# Patient Record
Sex: FEMALE | Race: OTHER | Employment: UNEMPLOYED | URBAN - METROPOLITAN AREA
[De-identification: names, ages, dates, MRNs, and addresses within clinical notes are randomized per-mention and may not be internally consistent; named-entity substitution may affect disease eponyms.]

---

## 2023-12-14 ENCOUNTER — TELEPHONE (OUTPATIENT)
Dept: PERINATAL CARE | Age: 23
End: 2023-12-14

## 2024-01-05 RX ORDER — ASPIRIN 81 MG/81MG
2 CAPSULE ORAL DAILY
COMMUNITY

## 2024-01-05 RX ORDER — .BETA.-CAROTENE, ASCORBIC ACID, CHOLECALCIFEROL, .ALPHA.-TOCOPHEROL ACETATE, DL-, THIAMINE MONONITRATE, RIBOFLAVIN, NIACINAMIDE, PYRIDOXINE HYDROCHLORIDE, FOLIC ACID, CYANOCOBALAMIN, CALCIUM PANTOTHENATE, CALCIUM CARBONATE, FERROUS FUMARATE, ZINC OXIDE, AND DOCUSATE SODIUM 1000; 100; 400; 30; 3; 3; 15; 20; 1; 12; 7; 200; 29; 20; 25 [IU]/1; MG/1; [IU]/1; [IU]/1; MG/1; MG/1; MG/1; MG/1; MG/1; UG/1; MG/1; MG/1; MG/1; MG/1; MG/1
1 TABLET, COATED ORAL DAILY
COMMUNITY

## 2024-01-08 ENCOUNTER — TELEPHONE (OUTPATIENT)
Dept: PERINATAL CARE | Age: 24
End: 2024-01-08

## 2024-01-10 ENCOUNTER — ROUTINE PRENATAL (OUTPATIENT)
Dept: PERINATAL CARE | Age: 24
End: 2024-01-10

## 2024-01-10 VITALS
WEIGHT: 107 LBS | HEART RATE: 102 BPM | DIASTOLIC BLOOD PRESSURE: 77 MMHG | SYSTOLIC BLOOD PRESSURE: 113 MMHG | BODY MASS INDEX: 18.96 KG/M2 | HEIGHT: 63 IN

## 2024-01-10 DIAGNOSIS — O30.032 MONOCHORIONIC DIAMNIOTIC TWIN GESTATION IN SECOND TRIMESTER: Primary | ICD-10-CM

## 2024-01-10 PROCEDURE — 76805 OB US >/= 14 WKS SNGL FETUS: CPT

## 2024-01-10 RX ORDER — LANOLIN ALCOHOL/MO/W.PET/CERES
50 CREAM (GRAM) TOPICAL DAILY
COMMUNITY

## 2024-01-10 NOTE — PROGRESS NOTES
Patient seen today for consult and level II ultrasound for mono/di twins. Denies any recent vaginal bleeding. Denies leaking of fluid or pain and has not felt fetal movement yet.

## 2024-01-10 NOTE — PROGRESS NOTES
[facility]    The patient was seen for a sonogram and consultation regarding mono/di twins.    Please see the full ultrasound report under the Media tab.    Maria Alejandra Johnson MD  01/10/24

## 2024-01-24 ENCOUNTER — ROUTINE PRENATAL (OUTPATIENT)
Dept: PERINATAL CARE | Age: 24
End: 2024-01-24

## 2024-01-24 VITALS
SYSTOLIC BLOOD PRESSURE: 99 MMHG | DIASTOLIC BLOOD PRESSURE: 66 MMHG | HEART RATE: 100 BPM | BODY MASS INDEX: 19.31 KG/M2 | WEIGHT: 109 LBS

## 2024-01-24 DIAGNOSIS — O30.032 MONOCHORIONIC DIAMNIOTIC TWIN PREGNANCY IN SECOND TRIMESTER: Primary | ICD-10-CM

## 2024-01-24 PROCEDURE — 76816 OB US FOLLOW-UP PER FETUS: CPT

## 2024-01-24 PROCEDURE — 76817 TRANSVAGINAL US OBSTETRIC: CPT

## 2024-01-24 PROCEDURE — 76821 MIDDLE CEREBRAL ARTERY ECHO: CPT

## 2024-01-24 PROCEDURE — 76820 UMBILICAL ARTERY ECHO: CPT

## 2024-01-24 NOTE — PROGRESS NOTES
Pt seen today for cervical length MCA and UA dopplers,assess fetal bladders and amniotic fluid levels-mono/di twins and growth. Pt states regarding movement, she \"feels like little pinching in her belly.\" Pt denies leaking of fluid or vaginal bleeding.

## 2024-01-24 NOTE — PROGRESS NOTES
Maternal-Fetal Medicine    Please see the full documentation embedded in the ultrasound report (in Media Tab)  for complete detail.     Short synopsis of the findings & recommendations:     Both twins with appropriate EFWs and Doppler findings. They are concordant with appropriate amniotic fluid volumes each. Bladders are visible for both.   - Cervical length is reassuring.   - No previa.     Electronically signed by Edgar Reese MD on 1/24/2024 at 3:54 PM

## 2024-02-07 ENCOUNTER — ROUTINE PRENATAL (OUTPATIENT)
Dept: PERINATAL CARE | Age: 24
End: 2024-02-07

## 2024-02-07 VITALS
WEIGHT: 111 LBS | SYSTOLIC BLOOD PRESSURE: 100 MMHG | HEART RATE: 109 BPM | BODY MASS INDEX: 19.66 KG/M2 | DIASTOLIC BLOOD PRESSURE: 70 MMHG

## 2024-02-07 DIAGNOSIS — Z3A.19 19 WEEKS GESTATION OF PREGNANCY: ICD-10-CM

## 2024-02-07 DIAGNOSIS — O30.032 MONOCHORIONIC DIAMNIOTIC TWIN GESTATION IN SECOND TRIMESTER: Primary | ICD-10-CM

## 2024-02-07 PROCEDURE — 76820 UMBILICAL ARTERY ECHO: CPT

## 2024-02-07 PROCEDURE — 76811 OB US DETAILED SNGL FETUS: CPT

## 2024-02-07 PROCEDURE — 76821 MIDDLE CEREBRAL ARTERY ECHO: CPT

## 2024-02-07 PROCEDURE — 76812 OB US DETAILED ADDL FETUS: CPT

## 2024-02-07 PROCEDURE — 76817 TRANSVAGINAL US OBSTETRIC: CPT

## 2024-02-07 NOTE — PROGRESS NOTES
Pt seen today for Level II ultrasound for growth, cervical length, MCA and UA dopplers, assess fetal bladders, and amniotic fluid levels-mono/di twins. Pt reports positive fetal movement with both babies. Denies leaking of fluid or vaginal bleeding. Pt reports lot of discharge. Pt also reports bowel movement X 2 this morning with medium amount of blood. This has been only occurrence this far. Pt reports stool is hard. Instructed pt to call OB provider  to let them know having hard stool with blood and to ask for stool softner. Pt verbalized understanding. Used [employer] language services- to communicate with pt.

## 2024-02-21 ENCOUNTER — ROUTINE PRENATAL (OUTPATIENT)
Dept: PERINATAL CARE | Age: 24
End: 2024-02-21

## 2024-02-21 VITALS
BODY MASS INDEX: 20.37 KG/M2 | WEIGHT: 115 LBS | HEART RATE: 94 BPM | SYSTOLIC BLOOD PRESSURE: 92 MMHG | DIASTOLIC BLOOD PRESSURE: 61 MMHG

## 2024-02-21 DIAGNOSIS — O09.92 SUPERVISION OF HIGH RISK PREGNANCY IN SECOND TRIMESTER: ICD-10-CM

## 2024-02-21 DIAGNOSIS — Z3A.21 21 WEEKS GESTATION OF PREGNANCY: ICD-10-CM

## 2024-02-21 DIAGNOSIS — O30.032 MONOCHORIONIC DIAMNIOTIC TWIN GESTATION IN SECOND TRIMESTER: Primary | ICD-10-CM

## 2024-02-21 PROCEDURE — 76816 OB US FOLLOW-UP PER FETUS: CPT

## 2024-02-21 PROCEDURE — 76820 UMBILICAL ARTERY ECHO: CPT

## 2024-02-21 PROCEDURE — 76821 MIDDLE CEREBRAL ARTERY ECHO: CPT

## 2024-02-21 PROCEDURE — 76817 TRANSVAGINAL US OBSTETRIC: CPT

## 2024-02-21 NOTE — PROGRESS NOTES
[facility] at [facility]   The patient was seen for a detailed sonogram and consultation regarding mono/di twin gestation   Please see the full ultrasound report under the Media tab.   Maria Alejandra Whitfield DO   2/21/24

## 2024-02-21 NOTE — PROGRESS NOTES
Patient seen today for Level II ultrasound for growth, cervical length, MCA and UA dopplers, and to assess fetal bladders and amniotic fluid levels. Reports + fetal movement of both babies. Denies vaginal bleeding, leaking of fluid, or pain. Patient states she has an appointment at [facility] on 2/26/24.

## 2024-03-05 ENCOUNTER — TELEPHONE (OUTPATIENT)
Dept: PERINATAL CARE | Age: 24
End: 2024-03-05

## 2024-03-06 ENCOUNTER — TELEPHONE (OUTPATIENT)
Dept: PERINATAL CARE | Age: 24
End: 2024-03-06

## 2024-03-06 ENCOUNTER — ROUTINE PRENATAL (OUTPATIENT)
Dept: PERINATAL CARE | Age: 24
End: 2024-03-06

## 2024-03-06 VITALS
HEART RATE: 101 BPM | SYSTOLIC BLOOD PRESSURE: 99 MMHG | DIASTOLIC BLOOD PRESSURE: 68 MMHG | BODY MASS INDEX: 20.9 KG/M2 | WEIGHT: 118 LBS

## 2024-03-06 DIAGNOSIS — O30.032 MONOCHORIONIC DIAMNIOTIC TWIN GESTATION IN SECOND TRIMESTER: Primary | ICD-10-CM

## 2024-03-06 PROCEDURE — 76820 UMBILICAL ARTERY ECHO: CPT

## 2024-03-06 PROCEDURE — 76817 TRANSVAGINAL US OBSTETRIC: CPT

## 2024-03-06 PROCEDURE — 76816 OB US FOLLOW-UP PER FETUS: CPT

## 2024-03-06 PROCEDURE — 76821 MIDDLE CEREBRAL ARTERY ECHO: CPT

## 2024-03-06 NOTE — PROGRESS NOTES
Patient seen today for growth, UA and MCA dopplers, cervical length, DVP, and to assess fetal bladders of mono/di twins. Reports +fetal movement of both babies. Denies vaginal bleeding, leaking of fluid, or pain.

## 2024-03-20 ENCOUNTER — ROUTINE PRENATAL (OUTPATIENT)
Dept: PERINATAL CARE | Age: 24
End: 2024-03-20

## 2024-03-20 VITALS
WEIGHT: 123.4 LBS | SYSTOLIC BLOOD PRESSURE: 100 MMHG | DIASTOLIC BLOOD PRESSURE: 68 MMHG | BODY MASS INDEX: 21.86 KG/M2 | HEART RATE: 99 BPM

## 2024-03-20 DIAGNOSIS — O30.032 MONOCHORIONIC DIAMNIOTIC TWIN GESTATION IN SECOND TRIMESTER: Primary | ICD-10-CM

## 2024-03-20 PROCEDURE — 76820 UMBILICAL ARTERY ECHO: CPT

## 2024-03-20 PROCEDURE — 76816 OB US FOLLOW-UP PER FETUS: CPT

## 2024-03-20 PROCEDURE — 76821 MIDDLE CEREBRAL ARTERY ECHO: CPT

## 2024-03-20 NOTE — PROGRESS NOTES
Presents to [facility] for Level II UDS for growth, MCA and UA dopplers, assess fetal bladders, and JUSTEN for mono-di twins. Reports feeling good fetal movement, denies vaginal bleeding, leakage or pain.  available in person during visit.    [facility]   The patient was seen for a detailed sonogram and consultation regarding twin.   Please see the full ultrasound report under the Media tab. There is an issue in network reports not signed yet but both twins had normal fluid, concordant growth. Recommend to continue growth scan and  testing as scheduled.     Jluis Torres MD   3/20/24

## 2024-04-03 ENCOUNTER — ROUTINE PRENATAL (OUTPATIENT)
Dept: PERINATAL CARE | Age: 24
End: 2024-04-03

## 2024-04-03 VITALS
WEIGHT: 126 LBS | SYSTOLIC BLOOD PRESSURE: 93 MMHG | DIASTOLIC BLOOD PRESSURE: 62 MMHG | HEART RATE: 95 BPM | BODY MASS INDEX: 22.32 KG/M2

## 2024-04-03 DIAGNOSIS — O30.032 MONOCHORIONIC DIAMNIOTIC TWIN GESTATION IN SECOND TRIMESTER: Primary | ICD-10-CM

## 2024-04-03 PROCEDURE — 76816 OB US FOLLOW-UP PER FETUS: CPT

## 2024-04-03 PROCEDURE — 76820 UMBILICAL ARTERY ECHO: CPT

## 2024-04-03 PROCEDURE — 76821 MIDDLE CEREBRAL ARTERY ECHO: CPT

## 2024-04-03 NOTE — PROGRESS NOTES
Patient seen today for level II ultrasound for growth, MCA and UA dopplers, JUSTEN, and to assess fetal bladders for  mono/di twins. Reports good movement of both babies. Denies vaginal bleeding, leaking of fluid, or pain.

## 2024-04-03 NOTE — PROGRESS NOTES
[facility] at [facility]   The patient was seen for a follow up sonogram regarding mono/di twin gestation.   Please see the full ultrasound report under the Media tab.   Maria Alejandra Johnson MD   4/3/24

## 2024-04-17 ENCOUNTER — ROUTINE PRENATAL (OUTPATIENT)
Dept: PERINATAL CARE | Age: 24
End: 2024-04-17

## 2024-04-17 VITALS
SYSTOLIC BLOOD PRESSURE: 97 MMHG | WEIGHT: 127.8 LBS | DIASTOLIC BLOOD PRESSURE: 66 MMHG | HEART RATE: 90 BPM | BODY MASS INDEX: 22.64 KG/M2

## 2024-04-17 DIAGNOSIS — Z3A.29 29 WEEKS GESTATION OF PREGNANCY: ICD-10-CM

## 2024-04-17 DIAGNOSIS — O30.032 MONOCHORIONIC DIAMNIOTIC TWIN GESTATION IN SECOND TRIMESTER: Primary | ICD-10-CM

## 2024-04-17 PROCEDURE — 76820 UMBILICAL ARTERY ECHO: CPT

## 2024-04-17 PROCEDURE — 76816 OB US FOLLOW-UP PER FETUS: CPT

## 2024-04-17 PROCEDURE — 76821 MIDDLE CEREBRAL ARTERY ECHO: CPT

## 2024-04-17 NOTE — PROGRESS NOTES
Patient seen today for level II ultrasound for growth, MCA and UA dopplers, JUSTEN, and to assess fetal bladders for mono/di twins. Reports good movement of both babies. Denies vaginal bleeding, leaking of fluid, or pain